# Patient Record
Sex: MALE | Race: WHITE | Employment: FULL TIME | URBAN - METROPOLITAN AREA
[De-identification: names, ages, dates, MRNs, and addresses within clinical notes are randomized per-mention and may not be internally consistent; named-entity substitution may affect disease eponyms.]

---

## 2017-11-07 ENCOUNTER — OFFICE VISIT (OUTPATIENT)
Dept: ORTHOPEDIC SURGERY | Age: 67
End: 2017-11-07

## 2017-11-07 VITALS
HEART RATE: 62 BPM | WEIGHT: 169.09 LBS | DIASTOLIC BLOOD PRESSURE: 80 MMHG | HEIGHT: 70 IN | BODY MASS INDEX: 24.21 KG/M2 | SYSTOLIC BLOOD PRESSURE: 137 MMHG

## 2017-11-07 DIAGNOSIS — M25.572 ACUTE LEFT ANKLE PAIN: Primary | ICD-10-CM

## 2017-11-07 PROCEDURE — 99212 OFFICE O/P EST SF 10 MIN: CPT | Performed by: ORTHOPAEDIC SURGERY

## 2017-11-07 PROCEDURE — G8427 DOCREV CUR MEDS BY ELIG CLIN: HCPCS | Performed by: ORTHOPAEDIC SURGERY

## 2017-11-07 PROCEDURE — 4040F PNEUMOC VAC/ADMIN/RCVD: CPT | Performed by: ORTHOPAEDIC SURGERY

## 2017-11-07 PROCEDURE — 1123F ACP DISCUSS/DSCN MKR DOCD: CPT | Performed by: ORTHOPAEDIC SURGERY

## 2017-11-07 PROCEDURE — G8484 FLU IMMUNIZE NO ADMIN: HCPCS | Performed by: ORTHOPAEDIC SURGERY

## 2017-11-07 PROCEDURE — 1036F TOBACCO NON-USER: CPT | Performed by: ORTHOPAEDIC SURGERY

## 2017-11-07 PROCEDURE — G8420 CALC BMI NORM PARAMETERS: HCPCS | Performed by: ORTHOPAEDIC SURGERY

## 2017-11-07 PROCEDURE — 3017F COLORECTAL CA SCREEN DOC REV: CPT | Performed by: ORTHOPAEDIC SURGERY

## 2017-11-07 RX ORDER — TAMSULOSIN HYDROCHLORIDE 0.4 MG/1
CAPSULE ORAL
Refills: 3 | COMMUNITY
Start: 2017-10-02 | End: 2018-01-31 | Stop reason: ALTCHOICE

## 2017-11-07 RX ORDER — FINASTERIDE 5 MG/1
TABLET, FILM COATED ORAL
Refills: 1 | COMMUNITY
Start: 2017-09-14 | End: 2018-01-31 | Stop reason: ALTCHOICE

## 2017-11-09 ENCOUNTER — HOSPITAL ENCOUNTER (OUTPATIENT)
Dept: MRI IMAGING | Age: 67
Discharge: HOME | End: 2017-11-09
Attending: ORTHOPAEDIC SURGERY | Admitting: ORTHOPAEDIC SURGERY

## 2017-11-09 DIAGNOSIS — M25.572 ACUTE LEFT ANKLE PAIN: ICD-10-CM

## 2017-11-09 DIAGNOSIS — M25.572 PAIN IN LEFT ANKLE: ICD-10-CM

## 2017-11-14 ENCOUNTER — TELEPHONE (OUTPATIENT)
Dept: ORTHOPEDIC SURGERY | Age: 67
End: 2017-11-14

## 2017-11-14 DIAGNOSIS — R20.0 NUMBNESS AND TINGLING OF FOOT: ICD-10-CM

## 2017-11-14 DIAGNOSIS — S86.002D INJURY OF LEFT ACHILLES TENDON, SUBSEQUENT ENCOUNTER: Primary | ICD-10-CM

## 2017-11-14 DIAGNOSIS — R20.2 NUMBNESS AND TINGLING OF FOOT: ICD-10-CM

## 2017-11-15 NOTE — TELEPHONE ENCOUNTER
So he came in just to get his MRI scan because the company that he is going to see in [place] couldn't order one here. If he wants to look further into numbness tingling and weakness I can get an EMG to make sure he has no nerve damage. I was under the impression he just wanted to get the MRI scan done so they can begin treating him in [place].

## 2017-11-15 NOTE — TELEPHONE ENCOUNTER
[facility] informing pt. Asked him to call me back if he wanted to proceed with the EMG.

## 2017-11-15 NOTE — TELEPHONE ENCOUNTER
Pt informed and he would like to know what could be causing the pain that makes him limp and the tingling & numbness in his foot ? No

## 2017-11-17 ENCOUNTER — TELEPHONE (OUTPATIENT)
Dept: ORTHOPEDIC SURGERY | Age: 67
End: 2017-11-17

## 2017-12-21 ENCOUNTER — OFFICE VISIT (OUTPATIENT)
Dept: ORTHOPEDIC SURGERY | Age: 67
End: 2017-12-21

## 2017-12-21 VITALS — BODY MASS INDEX: 24.21 KG/M2 | WEIGHT: 169.09 LBS | HEIGHT: 70 IN

## 2017-12-21 DIAGNOSIS — R20.0 NUMBNESS, LIMB: Primary | ICD-10-CM

## 2017-12-21 PROCEDURE — 95886 MUSC TEST DONE W/N TEST COMP: CPT | Performed by: PHYSICAL MEDICINE & REHABILITATION

## 2017-12-21 PROCEDURE — 95908 NRV CNDJ TST 3-4 STUDIES: CPT | Performed by: PHYSICAL MEDICINE & REHABILITATION

## 2017-12-21 NOTE — PROGRESS NOTES
ELECTRODIAGNOSTIC EXAMINATION  [facility]      Patient: Tsering Brown Age: 74 Years 6 Months  Sex: Male Date: 12/21/17  YOB: 1950 Ref. Phys.: [id number]  Notes:  h/o left achilles rupture; left calf pain, cramping, numbness in foot; h/o left lumbar discectomy x2      Sensory NCS      Nerve / Sites Peak PeakAmp Dist Maxim    ms µV cm m/s   L SURAL   1. Calf 4.20 13.3 14 41.2   2. Motor NCS      Nerve / Sites Lat Amp Amp Dist Maxim    ms mV % cm m/s   L COMM PERONEAL - EDB   1. Ankle 4.65 10.2 100 8    2. FibHead 12.60 9.5 92.8 32 40.3   3. Knee 14.75 10.4 102 10 46.5   L TIBIAL (KNEE) - AH   1. Ankle 4.90 3.7 100 8    2. Knee 15.25 4.1 113 42 40.6       F  Wave      Nerve Fmin    ms   L TIBIAL (KNEE) 55.00       EMG Summary Table     Spontaneous MUAP Recruit. Ins. Act Fibs. PSW Fasics. H.F. Amp. Dur. Poly's. Pattern   L. FIRST D INTEROSS N None None None None N N N N   L. LUMB PSP (L) + + + None None N N N N   L. GASTROCN (MED) N None None None None N N N N   L. TIB ANTERIOR N None None None None N N N N   L. VAST MEDIALIS N None None None None N N N N   L. PERON LONGUS N None None None None N N N N   L. GLUTEUS MAX N None None None None N N N N   L. GLUTEUS MED N None None None None N N N N   L. LUMB PSP (U) N None None None None N N N N   L. LUMB PSP (M) N None None None None N N N N   L. BICEPS N None None None None N N N N       Summary: Nerve conduction studies are normal.  Monopolar exam of the left lower extremity is normal.  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 Isolated axonal loss can be seen post surgically in lumbar paraspinals. Impression: Normal examination. 1.  No definitive evidence of a left lower extremity mononeuropathy, plexopathy, or radiculopathy.             Salas Russell MD

## 2017-12-26 ENCOUNTER — TELEPHONE (OUTPATIENT)
Dept: ORTHOPEDIC SURGERY | Age: 67
End: 2017-12-26

## 2017-12-29 ENCOUNTER — TELEPHONE (OUTPATIENT)
Dept: ORTHOPEDIC SURGERY | Age: 67
End: 2017-12-29

## 2017-12-29 DIAGNOSIS — R20.2 NUMBNESS AND TINGLING OF LEFT LOWER EXTREMITY: Primary | ICD-10-CM

## 2017-12-29 DIAGNOSIS — R20.0 NUMBNESS AND TINGLING OF LEFT LOWER EXTREMITY: Primary | ICD-10-CM

## 2017-12-29 NOTE — TELEPHONE ENCOUNTER
Pt would like to proceed with a Lumbar MRI since his previous ankle MRI was normal and so was the EMG. He would like to find out what is causing the numbness & tingling in his foot.

## 2018-01-09 ENCOUNTER — HOSPITAL ENCOUNTER (OUTPATIENT)
Dept: MRI IMAGING | Age: 68
Discharge: HOME | End: 2018-01-09
Attending: ORTHOPAEDIC SURGERY | Admitting: ORTHOPAEDIC SURGERY

## 2018-01-09 DIAGNOSIS — R20.0 NUMBNESS AND TINGLING OF LEFT LOWER EXTREMITY: ICD-10-CM

## 2018-01-09 DIAGNOSIS — R20.2 NUMBNESS AND TINGLING OF LEFT LOWER EXTREMITY: ICD-10-CM

## 2018-01-09 DIAGNOSIS — R20.0 ANESTHESIA OF SKIN: ICD-10-CM

## 2018-01-11 ENCOUNTER — TELEPHONE (OUTPATIENT)
Dept: ORTHOPEDIC SURGERY | Age: 68
End: 2018-01-11

## 2018-01-11 NOTE — TELEPHONE ENCOUNTER
Pt calling to get the results of his Lumbar Spine MRI  done on 1-9-18 at [facility]. Results are in EPIC.

## 2018-01-31 ENCOUNTER — OFFICE VISIT (OUTPATIENT)
Dept: ORTHOPEDIC SURGERY | Age: 68
End: 2018-01-31

## 2018-01-31 VITALS
DIASTOLIC BLOOD PRESSURE: 82 MMHG | WEIGHT: 169.09 LBS | SYSTOLIC BLOOD PRESSURE: 137 MMHG | HEART RATE: 52 BPM | HEIGHT: 70 IN | BODY MASS INDEX: 24.21 KG/M2

## 2018-01-31 DIAGNOSIS — R20.2 NUMBNESS AND TINGLING OF FOOT: Primary | ICD-10-CM

## 2018-01-31 DIAGNOSIS — R20.0 NUMBNESS AND TINGLING OF FOOT: Primary | ICD-10-CM

## 2018-01-31 PROCEDURE — 4040F PNEUMOC VAC/ADMIN/RCVD: CPT | Performed by: PHYSICAL MEDICINE & REHABILITATION

## 2018-01-31 PROCEDURE — G8427 DOCREV CUR MEDS BY ELIG CLIN: HCPCS | Performed by: PHYSICAL MEDICINE & REHABILITATION

## 2018-01-31 PROCEDURE — G8484 FLU IMMUNIZE NO ADMIN: HCPCS | Performed by: PHYSICAL MEDICINE & REHABILITATION

## 2018-01-31 PROCEDURE — 3017F COLORECTAL CA SCREEN DOC REV: CPT | Performed by: PHYSICAL MEDICINE & REHABILITATION

## 2018-01-31 PROCEDURE — G8420 CALC BMI NORM PARAMETERS: HCPCS | Performed by: PHYSICAL MEDICINE & REHABILITATION

## 2018-01-31 PROCEDURE — 1036F TOBACCO NON-USER: CPT | Performed by: PHYSICAL MEDICINE & REHABILITATION

## 2018-01-31 PROCEDURE — 1123F ACP DISCUSS/DSCN MKR DOCD: CPT | Performed by: PHYSICAL MEDICINE & REHABILITATION

## 2018-01-31 PROCEDURE — 99213 OFFICE O/P EST LOW 20 MIN: CPT | Performed by: PHYSICAL MEDICINE & REHABILITATION
